# Patient Record
Sex: FEMALE | NOT HISPANIC OR LATINO | URBAN - METROPOLITAN AREA
[De-identification: names, ages, dates, MRNs, and addresses within clinical notes are randomized per-mention and may not be internally consistent; named-entity substitution may affect disease eponyms.]

---

## 2017-01-31 ENCOUNTER — IMPORTED ENCOUNTER (OUTPATIENT)
Dept: URBAN - METROPOLITAN AREA CLINIC 1 | Facility: CLINIC | Age: 72
End: 2017-01-31

## 2017-01-31 PROBLEM — H04.123: Noted: 2017-01-31

## 2017-01-31 PROBLEM — H16.143: Noted: 2017-01-31

## 2017-01-31 PROBLEM — H25.813: Noted: 2017-01-31

## 2017-01-31 PROBLEM — H31.092: Noted: 2017-01-31

## 2017-01-31 PROBLEM — H43.811: Noted: 2017-01-31

## 2017-01-31 PROCEDURE — 92004 COMPRE OPH EXAM NEW PT 1/>: CPT

## 2017-01-31 PROCEDURE — 92015 DETERMINE REFRACTIVE STATE: CPT

## 2017-01-31 NOTE — PATIENT DISCUSSION
1.  Cataract OU- Observe for now without intervention. The patient was advised to contact us if any change or worsening of vision2. LAKEISHA w/ PEK OU- The increase of artificial tears OU QID were recommended. 3.  PVD w/o Tear OD- Old/stable. 4. Chorioretinal Scars OS- Stable. 5. H/o Lasik OU6. Return for an appointment for a 27 in 1 year with Dr. Sylvain Sierra.

## 2017-02-24 ENCOUNTER — IMPORTED ENCOUNTER (OUTPATIENT)
Dept: URBAN - METROPOLITAN AREA CLINIC 1 | Facility: CLINIC | Age: 72
End: 2017-02-24

## 2017-02-24 NOTE — PATIENT DISCUSSION
MRX for glasses given discussed with patient to take mrx back to [facility] and have glasses remade. Told patient also Seg Height is higher in this MRX than in previous. (Current MRX seg height measures at 21 old rx seg height measures at 16) MRX for glasses measured today given to patient.

## 2017-07-06 ENCOUNTER — IMPORTED ENCOUNTER (OUTPATIENT)
Dept: URBAN - METROPOLITAN AREA CLINIC 1 | Facility: CLINIC | Age: 72
End: 2017-07-06

## 2017-07-06 PROBLEM — H04.123: Noted: 2017-07-06

## 2017-07-06 PROBLEM — H25.813: Noted: 2017-07-06

## 2017-07-06 PROBLEM — H16.143: Noted: 2017-07-06

## 2017-07-06 PROBLEM — H31.092: Noted: 2017-07-06

## 2017-07-06 PROBLEM — H43.811: Noted: 2017-07-06

## 2017-07-06 PROCEDURE — 92015 DETERMINE REFRACTIVE STATE: CPT

## 2017-07-06 PROCEDURE — 92012 INTRM OPH EXAM EST PATIENT: CPT

## 2017-07-06 NOTE — PATIENT DISCUSSION
1.  Cataract OU:  Visually Significant secondary to glare discussed the risks benefits alternatives and limitations of cataract surgery. The patient stated a full understanding and a desire to proceed with the procedure. The patient will need to return for preop appointment with cataract measurements and to have any additional questions answered and start pre-operative eye drops as directed. Phaco PCL OS then OD. Not MF Candidate. (Otherwise follow-up as scheduled) 2. LAKEISHA w/ PEK OU- Cont ATs BID OU Routinely. 3.  PVD w/o Tear OD - RD precautions. 4.  Chorioretinal Scars OS - stable observe. 5.  H/o LASIK OU - Pt unsure if hyperopic or myopic; Although procedure was performed in 90's likely Myopic LASIK. 6.  H/o RD OS Return for an appointment with Dr. David Mason for AS/HP.

## 2017-09-21 ENCOUNTER — IMPORTED ENCOUNTER (OUTPATIENT)
Dept: URBAN - METROPOLITAN AREA CLINIC 1 | Facility: CLINIC | Age: 72
End: 2017-09-21

## 2017-09-21 PROBLEM — H25.813: Noted: 2017-09-21

## 2017-09-21 PROCEDURE — 92136 OPHTHALMIC BIOMETRY: CPT

## 2017-09-21 NOTE — PATIENT DISCUSSION
Cataract OU: Visually Significant secondary to glare discussed the risks benefits alternatives and limitations of cataract surgery. The patient stated a full understanding and a desire to proceed with the procedure. The patient will need to start pre-operative eye drops as directed. *guarded prognosis given prior Lasik. After further discussion pt states she was hyperopicProceed w/ phaco PCL OS. Pt understands they will need glasses post-op to achieve their best corrected vision. Return for an appointment for sx OS with Dr. Jimena Hines.

## 2017-10-04 ENCOUNTER — IMPORTED ENCOUNTER (OUTPATIENT)
Dept: URBAN - METROPOLITAN AREA CLINIC 1 | Facility: CLINIC | Age: 72
End: 2017-10-04

## 2017-10-05 ENCOUNTER — IMPORTED ENCOUNTER (OUTPATIENT)
Dept: URBAN - METROPOLITAN AREA CLINIC 1 | Facility: CLINIC | Age: 72
End: 2017-10-05

## 2017-10-05 PROBLEM — Z96.1: Noted: 2017-10-05

## 2017-10-05 PROCEDURE — 99024 POSTOP FOLLOW-UP VISIT: CPT

## 2017-10-05 NOTE — PATIENT DISCUSSION
POD#1 CE/IOL OS (Toric) with mild corneal edema. H/o previous LASIK; H/o RD OS. Continue all 3 gtts as prescribed and until gone. Use Prednisolone BID OS Acular Qdaily OS Ocuflox TID OS Post op Warnings Reiterated RTC as scheduled

## 2017-10-12 ENCOUNTER — IMPORTED ENCOUNTER (OUTPATIENT)
Dept: URBAN - METROPOLITAN AREA CLINIC 1 | Facility: CLINIC | Age: 72
End: 2017-10-12

## 2017-10-12 PROBLEM — Z96.1: Noted: 2017-10-12

## 2017-10-12 PROBLEM — H25.811: Noted: 2017-10-12

## 2017-10-12 PROCEDURE — 92136 OPHTHALMIC BIOMETRY: CPT

## 2017-10-18 ENCOUNTER — IMPORTED ENCOUNTER (OUTPATIENT)
Dept: URBAN - METROPOLITAN AREA CLINIC 1 | Facility: CLINIC | Age: 72
End: 2017-10-18

## 2017-10-19 ENCOUNTER — IMPORTED ENCOUNTER (OUTPATIENT)
Dept: URBAN - METROPOLITAN AREA CLINIC 1 | Facility: CLINIC | Age: 72
End: 2017-10-19

## 2017-10-19 PROBLEM — Z96.1: Noted: 2017-10-19

## 2017-10-19 PROCEDURE — 99024 POSTOP FOLLOW-UP VISIT: CPT

## 2017-10-19 NOTE — PATIENT DISCUSSION
1. POD#1 CE/IOL OD (Standard w/ LRI) doing well. Continue all 3 gtts as prescribed and until gone. Use Prednisolone BID OD Acular Qdaily OD Ocuflox TID OD 2. POW#1  CE/IOL OS (Toric) doing well.   Use Prednisolone BID OS till out Use Acular Qdaily OS till out F/u as scheduled

## 2017-11-09 ENCOUNTER — IMPORTED ENCOUNTER (OUTPATIENT)
Dept: URBAN - METROPOLITAN AREA CLINIC 1 | Facility: CLINIC | Age: 72
End: 2017-11-09

## 2017-11-09 PROBLEM — Z96.1: Noted: 2017-11-09

## 2017-11-09 PROCEDURE — 99024 POSTOP FOLLOW-UP VISIT: CPT

## 2017-11-09 NOTE — PATIENT DISCUSSION
POM#1 CE/IOL OU Standard w/ LRI OD/ Toric OS. Slight inflammation. Expect specs for distance acuity. Continue Prednisolone OU BID until gone. Continue Ketorolac OU QD until gone. Return for an appointment for a repeat KR in 3-4 weeks with Dr. Gisele Randhawa.

## 2017-12-11 ENCOUNTER — IMPORTED ENCOUNTER (OUTPATIENT)
Dept: URBAN - METROPOLITAN AREA CLINIC 1 | Facility: CLINIC | Age: 72
End: 2017-12-11

## 2017-12-11 PROBLEM — Z96.1: Noted: 2017-12-11

## 2017-12-11 PROCEDURE — 99024 POSTOP FOLLOW-UP VISIT: CPT

## 2017-12-11 NOTE — PATIENT DISCUSSION
POM#1 CE/IOL OU doing well. Standard w/ LRI OD/ Toric OS. Inflammation resolved. Expect specs for distance acuity. Return for an appointment for a 30 in 4 months with Dr. Guanako Vicente.

## 2018-02-06 ENCOUNTER — IMPORTED ENCOUNTER (OUTPATIENT)
Dept: URBAN - METROPOLITAN AREA CLINIC 1 | Facility: CLINIC | Age: 73
End: 2018-02-06

## 2018-02-06 PROBLEM — Z96.1: Noted: 2018-02-06

## 2018-02-06 PROBLEM — H04.123: Noted: 2018-02-06

## 2018-02-06 PROBLEM — H31.092: Noted: 2018-02-06

## 2018-02-06 PROBLEM — H16.143: Noted: 2018-02-06

## 2018-02-06 PROBLEM — H31.091: Noted: 2018-02-06

## 2018-02-06 PROBLEM — H26.493: Noted: 2018-02-06

## 2018-02-06 PROBLEM — H43.811: Noted: 2018-02-06

## 2018-02-06 PROCEDURE — 92012 INTRM OPH EXAM EST PATIENT: CPT

## 2018-02-06 PROCEDURE — 92015 DETERMINE REFRACTIVE STATE: CPT

## 2018-02-06 NOTE — PATIENT DISCUSSION
1.  PCO OU- Visually Significant and yag cap recommended. Risks and benefits discussed with pt and pt desires to schedule yag cap. 2. LAKEISHA w/ PEK OU- Stable using tears PRN. The use of artificial tears were recommended OU TID routinely. 3.  Chorioretinal Scars OS from prior RD OS 4. PVD w/o Tear OD- Old stable. 5.  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 6.  Return for an appointment for Yag Cap OD then OS in 1-6 weeks with Dr. Silva Avalos.

## 2018-04-04 ENCOUNTER — IMPORTED ENCOUNTER (OUTPATIENT)
Dept: URBAN - METROPOLITAN AREA CLINIC 1 | Facility: CLINIC | Age: 73
End: 2018-04-04

## 2018-04-04 PROBLEM — H26.491: Noted: 2018-04-04

## 2018-04-04 PROCEDURE — 66821 AFTER CATARACT LASER SURGERY: CPT

## 2018-05-04 ENCOUNTER — IMPORTED ENCOUNTER (OUTPATIENT)
Dept: URBAN - METROPOLITAN AREA CLINIC 1 | Facility: CLINIC | Age: 73
End: 2018-05-04

## 2018-05-04 PROBLEM — H26.492: Noted: 2018-05-04

## 2018-05-04 PROCEDURE — 66821 AFTER CATARACT LASER SURGERY: CPT

## 2018-05-04 NOTE — PATIENT DISCUSSION
YAG CAP OS: (Consent signed and scanned into attachments) 1 gtt Prolensa applied. The purpose and nature of the procedure possible alternative methods of treatment the risks involved and the possibility of complications were discussed with patient. The Patient wishes to proceed and the consent was signed. The laser was then performed under topical anesthesia with no complications. Post op instructions were given to patient as well as a follow-up appointment. Patient was advised to call our office if any questions or concerns. Return for an appointment in [place] with Dr. Kyler Silva.

## 2018-05-21 ENCOUNTER — IMPORTED ENCOUNTER (OUTPATIENT)
Dept: URBAN - METROPOLITAN AREA CLINIC 1 | Facility: CLINIC | Age: 73
End: 2018-05-21

## 2018-05-21 PROCEDURE — 99024 POSTOP FOLLOW-UP VISIT: CPT

## 2018-05-21 NOTE — PATIENT DISCUSSION
PO YAG Cap OU: good result. MRX given. Return for an appointment for 30 in 4-6 months with Dr. Gretta Barakat.

## 2018-07-10 ENCOUNTER — IMPORTED ENCOUNTER (OUTPATIENT)
Dept: URBAN - METROPOLITAN AREA CLINIC 1 | Facility: CLINIC | Age: 73
End: 2018-07-10

## 2018-07-10 PROBLEM — H04.123: Noted: 2018-07-10

## 2018-07-10 PROBLEM — Z96.1: Noted: 2018-07-10

## 2018-07-10 PROBLEM — H16.143: Noted: 2018-07-10

## 2018-07-10 PROCEDURE — 92012 INTRM OPH EXAM EST PATIENT: CPT

## 2018-07-10 NOTE — PATIENT DISCUSSION
1.  LAKEISHA w/ PEK OU- Fluctuating VA likely related to progressed LAKEISHA OU. PMG demonstrated VA and pt read near card perfectly without any correction. Condition discussed w/ pt. Discussed the importance of using the tears routinely vs. PRN. Start Xiidra OU BID (samples given to pt.) Recommended using tears OU Q2hwa OU on a routine regiment. Pt cauterized LLs (partially OD.)2.  PO YAG Cap OU: good result. 3.  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 4. H/o Chorioretinal Scars OS from prior RD OS 5. H/o PVD w/o Tear OD- Old stable. 6. Return for an appointment for 10/check K / MRX for chart purposes (consider changing specs for distance if stable at next visit) with Dr. Wilman Messina.

## 2018-07-27 ENCOUNTER — IMPORTED ENCOUNTER (OUTPATIENT)
Dept: URBAN - METROPOLITAN AREA CLINIC 1 | Facility: CLINIC | Age: 73
End: 2018-07-27

## 2018-07-27 PROBLEM — H04.123: Noted: 2018-07-27

## 2018-07-27 PROBLEM — Z96.1: Noted: 2018-07-27

## 2018-07-27 PROBLEM — H16.143: Noted: 2018-07-27

## 2018-07-27 PROCEDURE — 99213 OFFICE O/P EST LOW 20 MIN: CPT

## 2018-07-27 NOTE — PATIENT DISCUSSION
1.  LAKEISHA OU- PEK resolved OU on Xiidra OU BID CPM. Continue tears OU Q2hwa OU on a routine regiment. Pt cauterized LLs (partially OD.) Condition discussed w/ pt. Urged compliance w/ drop regiment. 2. 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 H/o Chorioretinal Scars OS from prior RD OS 4. H/o PVD w/o Tear OD- Old stable. 5. Return as scheduled 30 in November with Dr. Fay Mcmillan.

## 2018-08-31 ENCOUNTER — IMPORTED ENCOUNTER (OUTPATIENT)
Dept: URBAN - METROPOLITAN AREA CLINIC 1 | Facility: CLINIC | Age: 73
End: 2018-08-31

## 2018-08-31 PROBLEM — H16.143: Noted: 2018-08-31

## 2018-08-31 PROBLEM — H04.123: Noted: 2018-08-31

## 2018-08-31 PROCEDURE — 92012 INTRM OPH EXAM EST PATIENT: CPT

## 2018-08-31 NOTE — PATIENT DISCUSSION
1.  LAKEISHA OU w/ resolved PEK- Pt symptomatic with reflex tearing and intermittent diplopia (H/o Cautery LLs OU RLL partially occluded) Ok to d/c Len Riley due to expense and symptoms. Discussed with patient the importance of remaining consistent with using ATs. Also switch to PF ATs Qh2rs OU w/a (Sample of PF Systane Ultra given). 2.  Pseudophakia OU (Standard w/ LRI OD Toric OS) Discussed with patient thoroughly she should expect to achieve her BCVA with specs due to previous h/o LASIK as well as h/o RD OS. Thoroughly discussed with patient her c/o intermittent diplopia is due to LAKEISHA and to be diligent with her tears. 3.  H/o Chorioretinal Scars OS from prior RD OS 4. H/o PVD w/o Tear OD- Old stable. 5. H/o LASIK OU 6. H/o RD OSReturn for an appointment in as scheduled with Dr. Wilman Messina.

## 2018-12-13 ENCOUNTER — IMPORTED ENCOUNTER (OUTPATIENT)
Dept: URBAN - METROPOLITAN AREA CLINIC 1 | Facility: CLINIC | Age: 73
End: 2018-12-13

## 2018-12-13 PROBLEM — H16.143: Noted: 2018-12-13

## 2018-12-13 PROBLEM — Z96.1: Noted: 2018-12-13

## 2018-12-13 PROBLEM — H04.123: Noted: 2018-12-13

## 2018-12-13 PROCEDURE — 92014 COMPRE OPH EXAM EST PT 1/>: CPT

## 2018-12-13 NOTE — PATIENT DISCUSSION
1.  LAKEISHA OU w/ increased PEK-  (H/o Cautery LLs OU RLL partially occluded) Ok to restart Xiidra BID OU (Form for patient assistance to pay for Xiidra given) Increase PF ATs to Q2hrs OU w/a.  2.  Pseudophakia OU (Standard w/ LRI OD Toric OS) Discussed with patient thoroughly she should expect to achieve her BCVA with specs due to previous h/o LASIK as well as h/o RD OS. Thoroughly discussed with patient her c/o intermittent diplopia is due to LAKEISHA and to be diligent with her tears. 3.  H/o Chorioretinal Scars OS from prior RD OS 4. H/o PVD w/o Tear OD- Old stable. 5. H/o LASIK OU 6. H/o RD OS7. Ptosis ULs OU- Consider UL Bleph OU. Letter to [address] for an appointment in May 10 with Dr. Mae Calderon.

## 2019-05-14 ENCOUNTER — IMPORTED ENCOUNTER (OUTPATIENT)
Dept: URBAN - METROPOLITAN AREA CLINIC 1 | Facility: CLINIC | Age: 74
End: 2019-05-14

## 2019-05-14 PROBLEM — H16.143: Noted: 2019-05-14

## 2019-05-14 PROBLEM — H04.123: Noted: 2019-05-14

## 2019-05-14 PROCEDURE — 92012 INTRM OPH EXAM EST PATIENT: CPT

## 2019-05-14 NOTE — PATIENT DISCUSSION
1.  LAKEISHA OU w/ improved PEK-  (H/o Cautery LLs OU RLL Partially occluded) Cont Xiidra BID OU continue PF ATs to Q2hrs OU w/a.  2.  Pseudophakia OU (Standard w/ LRI OD Toric OS) Discussed with patient thoroughly she should expect to achieve her BCVA with specs due to previous h/o LASIK as well as h/o RD OS. Thoroughly discussed with patient her c/o intermittent diplopia is due to LAKEISHA and to be diligent with her tears. 3.  H/o Chorioretinal Scars OS from prior RD OS 4. H/o PVD w/o Tear OD- Old stable. 5. H/o LASIK OU 6. H/o RD OS7. Ptosis ULs OU- Consider UL Bleph OU. Patient relays she had an episode of syncope this morning. Advised patient to call her Neurologist and relay this information. Patient states she has ongoing testing by Neurology. Return for an appointment in Nov/ Deb 30 with Dr. Thomas Guillermo.

## 2019-11-21 ENCOUNTER — IMPORTED ENCOUNTER (OUTPATIENT)
Dept: URBAN - METROPOLITAN AREA CLINIC 1 | Facility: CLINIC | Age: 74
End: 2019-11-21

## 2019-11-21 PROBLEM — H43.811: Noted: 2019-11-21

## 2019-11-21 PROBLEM — H16.143: Noted: 2019-11-21

## 2019-11-21 PROBLEM — H02.423: Noted: 2019-11-21

## 2019-11-21 PROBLEM — Z96.1: Noted: 2019-11-21

## 2019-11-21 PROBLEM — H04.123: Noted: 2019-11-21

## 2019-11-21 PROBLEM — H31.092: Noted: 2019-11-21

## 2019-11-21 PROCEDURE — 92014 COMPRE OPH EXAM EST PT 1/>: CPT

## 2019-11-21 NOTE — PATIENT DISCUSSION
1.  LAKEISHA w/ PEK OU- (LL cauterized OU RLL Partially occluded) no longer on Xiidra due to cost. Recommend increase ATs QID-Q2H OU routinely 2. Pseudophakia OU - (Standard w/ LRI OD Toric OS) 3.  Chorioretinal Scars OS - stable 4. Myogenic Ptosis OU - Will continue to observe at this time. 5.  PVD w/o Tear OD - RD precautions. 6.  H/o LASIK OU 7. H/o RD OS8.  H/o CVA - Follow up with Neurology/PCP as scheduled MRX for glasses given. Return for an appointment in 1 year 27 with Dr. Wilman Messina.

## 2020-11-19 ENCOUNTER — IMPORTED ENCOUNTER (OUTPATIENT)
Dept: URBAN - METROPOLITAN AREA CLINIC 1 | Facility: CLINIC | Age: 75
End: 2020-11-19

## 2020-11-19 PROBLEM — H04.123: Noted: 2020-11-19

## 2020-11-19 PROBLEM — H16.143: Noted: 2020-11-19

## 2020-11-19 PROBLEM — H02.423: Noted: 2020-11-19

## 2020-11-19 PROCEDURE — 92014 COMPRE OPH EXAM EST PT 1/>: CPT

## 2020-11-19 NOTE — PATIENT DISCUSSION
1.  LAKEISHA w/ PEK OU -- Increased PEK noted OU today despite using ATs BID. Recommend Increase ATs QID-Q2h OU. Can consider Restasis w/o improvement (H/o [employer] too expensive). 2.  Myogenic Ptosis OU -- Will continue to observe at this time. Consider surgical intervention after PEK improves. 3.  Pseudophakia OU -- (Standard w/ LRI OD Toric OS) 4.  Chorioretinal Scars OS -- Stable. 5.  PVD w/o Tear OD -- RD precautions. 6.  H/o LASIK OU 7. H/o RD OS8.  H/o CVA -- Follow up with Neurology/PCP as scheduledReturn for an appointment in 6 months for a 10/Check K. with Dr. Vasu Shah.

## 2022-04-02 ASSESSMENT — VISUAL ACUITY
OS_CC: J2
OD_GLARE: 20/80
OD_SC: 20/20
OS_SC: 20/30
OD_GLARE: 20/100
OD_CC: J1
OS_SC: 20/25
OD_SC: 20/20
OD_CC: J2
OD_SC: 20/30
OS_GLARE: 20/80
OD_CC: J2
OS_SC: 20/30-1
OS_CC: 20/40
OD_SC: 20/25
OD_SC: 20/40
OS_PH: SC 20/40
OD_SC: 20/30+2
OS_CC: 20/25
OS_SC: 20/25
OD_CC: 20/60
OS_CC: 20/60
OS_SC: 20/25
OS_SC: 20/40
OS_CC: J1
OS_CC: J1
OD_SC: 20/30
OS_CC: J1+
OS_GLARE: 20/400
OD_SC: 20/25
OD_SC: 20/20
OD_CC: J2
OS_SC: 20/40
OD_GLARE: 20/50
OS_SC: 20/25
OD_CC: J2
OS_SC: 20/30
OS_CC: J2
OS_SC: 20/30
OD_SC: 20/20-2
OD_GLARE: 20/60
OD_SC: 20/40
OD_SC: 20/25
OS_CC: 20/100
OD_SC: 20/20
OS_CC: 20/25
OS_CC: J2
OS_GLARE: 20/100
OS_SC: 20/25
OS_GLARE: 20/80
OD_SC: 20/20-1
OD_CC: J1
OD_CC: J1
OD_CC: 20/30
OD_CC: J1
OS_SC: 20/25
OD_CC: 20/100
OD_GLARE: 20/80
OS_GLARE: 20/60
OS_SC: 20/30
OS_CC: J2
OS_SC: 20/20
OS_CC: J1

## 2022-04-02 ASSESSMENT — TONOMETRY
OS_IOP_MMHG: 18
OD_IOP_MMHG: 13
OD_IOP_MMHG: 18
OS_IOP_MMHG: 17
OS_IOP_MMHG: 18
OD_IOP_MMHG: 18
OS_IOP_MMHG: 19
OS_IOP_MMHG: 20
OD_IOP_MMHG: 18
OS_IOP_MMHG: 14
OS_IOP_MMHG: 19
OD_IOP_MMHG: 17
OD_IOP_MMHG: 19
OS_IOP_MMHG: 16
OS_IOP_MMHG: 27
OS_IOP_MMHG: 22
OS_IOP_MMHG: 24
OD_IOP_MMHG: 16
OD_IOP_MMHG: 20
OD_IOP_MMHG: 18
OS_IOP_MMHG: 18
OD_IOP_MMHG: 19
OD_IOP_MMHG: 14
OD_IOP_MMHG: 16
OD_IOP_MMHG: 19
OS_IOP_MMHG: 19
OS_IOP_MMHG: 13
OD_IOP_MMHG: 18
OS_IOP_MMHG: 18
OS_IOP_MMHG: 21
OS_IOP_MMHG: 16
